# Patient Record
Sex: FEMALE | Race: WHITE | NOT HISPANIC OR LATINO | URBAN - METROPOLITAN AREA
[De-identification: names, ages, dates, MRNs, and addresses within clinical notes are randomized per-mention and may not be internally consistent; named-entity substitution may affect disease eponyms.]

---

## 2018-06-02 ENCOUNTER — EMERGENCY (EMERGENCY)
Facility: HOSPITAL | Age: 48
LOS: 0 days | Discharge: HOME | End: 2018-06-02
Attending: EMERGENCY MEDICINE | Admitting: EMERGENCY MEDICINE

## 2018-06-02 VITALS
RESPIRATION RATE: 18 BRPM | WEIGHT: 212.97 LBS | SYSTOLIC BLOOD PRESSURE: 138 MMHG | DIASTOLIC BLOOD PRESSURE: 86 MMHG | HEIGHT: 68 IN | OXYGEN SATURATION: 98 % | TEMPERATURE: 98 F | HEART RATE: 98 BPM

## 2018-06-02 DIAGNOSIS — F64.0 TRANSSEXUALISM: Chronic | ICD-10-CM

## 2018-06-02 DIAGNOSIS — M25.539 PAIN IN UNSPECIFIED WRIST: ICD-10-CM
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
## 2018-06-02 DIAGNOSIS — Z79.899 OTHER LONG TERM (CURRENT) DRUG THERAPY: ICD-10-CM

## 2018-06-02 DIAGNOSIS — S52.592A OTHER FRACTURES OF LOWER END OF LEFT RADIUS, INITIAL ENCOUNTER FOR CLOSED FRACTURE: ICD-10-CM
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
## 2018-06-02 RX ORDER — IBUPROFEN 200 MG
800 TABLET ORAL ONCE
Qty: 0 | Refills: 0 | Status: COMPLETED | OUTPATIENT
Start: 2018-06-02 | End: 2018-06-02

## 2018-06-02 RX ADMIN — Medication 800 MILLIGRAM(S): at 16:44

## 2018-06-02 NOTE — ED PROVIDER NOTE - OBJECTIVE STATEMENT
46 y/o female s/p fall PTA c/o left wrist pain and swelling. no other complaints. no head injury , neck or back pain. no tingling to digits

## 2018-06-02 NOTE — ED PROVIDER NOTE - ATTENDING CONTRIBUTION TO CARE
Pt is a 48yo female who comes in for L wrist pain after a fall onto outstretched arm while rollerskating at 4PM.  No numbness, no deformity.    Exam: tenderness to distal L radius, 2+ radial pulse, 5/5 hand , normal sensation in all fingers, no bony tenderness of hand or prox forearm/elbow/shoulder  Imp: fx  Plan: imaging, splint, ortho f/u

## 2018-06-02 NOTE — ED PROVIDER NOTE - PHYSICAL EXAMINATION
left wrist - +tenderness to distal radius and ulna with swelling, no step off / no snuffbox tenderness. good rom of digits/ no proximal tenderness/ good supination and pronation.

## 2018-06-02 NOTE — ED PROVIDER NOTE - NS ED ROS FT
Review of Systems    Constitutional: (-) fever or chills  respiratory: (-) cough (-) shortness of breath  Cardiovascular: (-) syncope, palpitations or chest pain  Integumentary: (-) rash or painful lymph nodes  Neurological: (-) altered mental status, headache or head injury

## 2019-09-24 ENCOUNTER — EMERGENCY (EMERGENCY)
Facility: HOSPITAL | Age: 49
LOS: 0 days | Discharge: HOME | End: 2019-09-24
Admitting: EMERGENCY MEDICINE
Payer: MEDICAID

## 2019-09-24 VITALS
OXYGEN SATURATION: 99 % | DIASTOLIC BLOOD PRESSURE: 99 MMHG | RESPIRATION RATE: 18 BRPM | WEIGHT: 214.07 LBS | HEART RATE: 99 BPM | SYSTOLIC BLOOD PRESSURE: 159 MMHG | HEIGHT: 69 IN | TEMPERATURE: 97 F

## 2019-09-24 DIAGNOSIS — S52.001A UNSPECIFIED FRACTURE OF UPPER END OF RIGHT ULNA, INITIAL ENCOUNTER FOR CLOSED FRACTURE: ICD-10-CM
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
## 2019-09-24 DIAGNOSIS — M25.572 PAIN IN LEFT ANKLE AND JOINTS OF LEFT FOOT: ICD-10-CM
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
## 2019-09-24 DIAGNOSIS — Z23 ENCOUNTER FOR IMMUNIZATION: ICD-10-CM

## 2019-09-24 DIAGNOSIS — F64.0 TRANSSEXUALISM: Chronic | ICD-10-CM
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
## 2019-09-24 PROCEDURE — 73610 X-RAY EXAM OF ANKLE: CPT | Mod: 26,LT

## 2019-09-24 PROCEDURE — 73080 X-RAY EXAM OF ELBOW: CPT | Mod: 26,RT

## 2019-09-24 PROCEDURE — 99284 EMERGENCY DEPT VISIT MOD MDM: CPT

## 2019-09-24 RX ORDER — TETANUS TOXOID, REDUCED DIPHTHERIA TOXOID AND ACELLULAR PERTUSSIS VACCINE, ADSORBED 5; 2.5; 8; 8; 2.5 [IU]/.5ML; [IU]/.5ML; UG/.5ML; UG/.5ML; UG/.5ML
0.5 SUSPENSION INTRAMUSCULAR ONCE
Refills: 0 | Status: COMPLETED | OUTPATIENT
Start: 2019-09-24 | End: 2019-09-24

## 2019-09-24 RX ADMIN — TETANUS TOXOID, REDUCED DIPHTHERIA TOXOID AND ACELLULAR PERTUSSIS VACCINE, ADSORBED 0.5 MILLILITER(S): 5; 2.5; 8; 8; 2.5 SUSPENSION INTRAMUSCULAR at 10:46

## 2019-09-24 NOTE — ED PROVIDER NOTE - CARE PROVIDER_API CALL
Jez Lay)  Orthopaedic Surgery  [address] [place] [address]  Phone: [phone]  Fax: [phone]  Follow Up Time: 1-3 Days

## 2019-09-24 NOTE — ED PROVIDER NOTE - OBJECTIVE STATEMENT
49 y.o female w/ no sig pmhx presents to the ED for evaluation of left ankle pain and right wrist pain x 2 days. Sustained inversion injury ankle yesterday with pain at lateral malleolus, sudden onset, mild severity, worse w/ ambulation, alleviated with rest, no radiation of pain. Also fell on R FOOSH and complaining of right elbow pain, mild severity, no radiation of pain, FROM without difficulty. Denies head injury, LOC, nausea, vomiting, abd pain, chest pain, back pain.  NO further complaints at this time.

## 2019-09-24 NOTE — ED PROVIDER NOTE - CLINICAL SUMMARY MEDICAL DECISION MAKING FREE TEXT BOX
no acute fx, sling right arm.  f/u with ortho.  I have discussed the discharge plan with the patient. The patient agrees with the plan, as discussed.  The patient understands Emergency Department diagnosis is a preliminary diagnosis often based on limited information and that the patient must adhere to the follow-up plan as discussed.  The patient understands that if the symptoms worsen or if prescribed medications do not have the desired/planned effect that the patient may return to the Emergency Department at any time for further evaluation and treatment.

## 2019-09-24 NOTE — ED PROVIDER NOTE - NS ED ROS FT
Varicose veins with pain to both legs    History of vein stripping by Dr Ileen Holstein approximately 2001  -Wearing 20-30 mmHg knee high compression stockings on a daily basis during waking hours can help manage your symptoms  -Other things we discussed that can help manage varicose vein pain:  Periodic elevation, regular physical activity, and weight loss  -Pay close attention to your symptoms over the next 90 days while you are trialing conservative therapy  -You will have a venous reflux assessment in 90 days and followup with a physician afterward for further recommendations Constitutional: See HPI.  Eyes: No visual changes, eye pain or discharge. No Photophobia  ENMT: No hearing changes, pain, discharge or infections. No neck pain or stiffness. No limited ROM  Cardiac: No SOB or edema. No chest pain with exertion.  Respiratory: No cough or respiratory distress. No hemoptysis. No history of asthma or RAD.  GI: No nausea, vomiting, diarrhea or abdominal pain.  : No dysuria, frequency or burning. No Discharge  MS: + L ankle pain, + R wrist pain. No myalgia,  back pain.  Neuro: No headache or weakness. No LOC.  Skin: No skin rash.  Except as documented in the HPI, all other systems are negative.

## 2019-09-24 NOTE — ED PROVIDER NOTE - PHYSICAL EXAMINATION
CONST: Well appearing in NAD  HEAD: NC/AT  EYES: Sclera and conjunctiva clear.  NECK: No midline C/T/L tenderness   CARD: Normal S1 S2; Normal rate and rhythm  RESP: Equal BS B/L, No wheezes, rhonchi or rales. No distress  GI: Soft, non-tender, non-distended, no CVA tenderness   MS: + TTP left lateral malleolus, no TTP of hip or knee, + TTP right medial elbow, FROM without difficulty, no ecchymosis or swelling, Normal ROM in all extremities. No edema of lower extremities, no calf pain, radial/pedal pulses 2+ bilaterally  SKIN: Warm, dry, no acute rashes. Good turgor  NEURO: A&Ox3, No focal deficits. Strength 5/5 with no sensory deficits. Steady gait

## 2019-09-24 NOTE — ED PROVIDER NOTE - NSFOLLOWUPINSTRUCTIONS_ED_ALL_ED_FT
Fracture    A fracture is a break in one of your bones. This can occur from a variety of injuries, especially traumatic ones. Symptoms include pain, bruising, or swelling. Do not use the injured limb. If a fracture is in one of the bones below your waist, do not put weight on that limb unless instructed to do so by your healthcare provider. Crutches or a cane may have been provided. A splint or cast may have been applied by your health care provider. Make sure to keep it dry and follow up with an orthopedist as instructed.    SEEK IMMEDIATE MEDICAL CARE IF YOU HAVE ANY OF THE FOLLOWING SYMPTOMS: numbness, tingling, increasing pain, or weakness in any part of the injured limb.    Follow up with your primary medical doctor in 1-2 days

## 2019-09-24 NOTE — ED ADULT TRIAGE NOTE - CHIEF COMPLAINT QUOTE
"I was taking a walk yesterday outside and missed a step and twisted my left ankle and my right arm hurts"

## 2019-09-24 NOTE — ED PROVIDER NOTE - PATIENT PORTAL LINK FT
You can access the FollowMyHealth Patient Portal offered by [facility] by registering at the following website: [email or web address]. By joining [employer] FollowMyHealth portal, you will also be able to view your health information using other applications (apps) compatible with our system.

## 2022-09-15 ENCOUNTER — APPOINTMENT (OUTPATIENT)
Dept: ORTHOPEDIC SURGERY | Facility: CLINIC | Age: 52
End: 2022-09-15

## 2022-09-15 VITALS — WEIGHT: 225 LBS | HEIGHT: 69 IN | BODY MASS INDEX: 33.33 KG/M2

## 2022-09-15 DIAGNOSIS — S49.92XA UNSPECIFIED INJURY OF LEFT SHOULDER AND UPPER ARM, INITIAL ENCOUNTER: ICD-10-CM

## 2022-09-15 DIAGNOSIS — Z78.9 OTHER SPECIFIED HEALTH STATUS: ICD-10-CM

## 2022-09-15 PROBLEM — Z00.00 ENCOUNTER FOR PREVENTIVE HEALTH EXAMINATION: Status: ACTIVE | Noted: 2022-09-15

## 2022-09-15 PROCEDURE — 99203 OFFICE O/P NEW LOW 30 MIN: CPT

## 2022-09-15 PROCEDURE — 99072 ADDL SUPL MATRL&STAF TM PHE: CPT

## 2022-09-15 PROCEDURE — 73080 X-RAY EXAM OF ELBOW: CPT | Mod: LT

## 2022-09-15 PROCEDURE — 73030 X-RAY EXAM OF SHOULDER: CPT | Mod: LT

## 2022-09-15 NOTE — HISTORY OF PRESENT ILLNESS
[de-identified] : Patient is a 52-year-old female here for left shoulder/ elbow pain.  Patient states on 08/20/2022 she tripped on the sidewalk outside in fell on her left shoulder and elbow.  Patient states she was immediate pain but iced and he the area.  Patient states over time and the pain was improving.  Patient states that she is still having on and off pain went to grab onto a bus handle a few days ago and felt a worsening pain in the elbow/shoulder area.  Patient denies numbness and tingling.

## 2022-09-15 NOTE — PHYSICAL EXAM
[Left] : left elbow [FreeTextEntry8] :  mild tenderness to anterior glenohumeral joint [] : no pain with forced wrist flexion [de-identified] :  no pain to palpation throughout elbow [FreeTextEntry9] :  full range of motion with mild discomfort to  elbow [de-identified] :  good strength throughout

## 2022-09-15 NOTE — DISCUSSION/SUMMARY
[de-identified] :  discussed x-rays in detail patient.   patient has a strain/ contusion of shoulder elbow.  Discussed treatment options including rest, anti-inflammatories, range-of-motion exercise, physical therapy, ice /heat.  Physical therapy script given.  Follow-up in 6-8 weeks for re-evaluation sports department and Hand Department.  Call if symptoms worsen or change.  Patient understands agrees with plan.  discussed with patient if her symptoms 100% improved she can cancel her appointment.  Seen under supervision of Dr. Ibrahim

## 2022-09-15 NOTE — DATA REVIEWED
[Shoulder] : shoulder [Left] : left [Elbow] : elbow [FreeTextEntry1] : X-ray: no acute fractures, subluxations, dislocations.  Mild osteoarthritis, AC joint arthrosis [FreeTextEntry2] :  x-ray: no acute fractures, subluxations, dislocations

## 2022-10-27 ENCOUNTER — APPOINTMENT (OUTPATIENT)
Dept: ORTHOPEDIC SURGERY | Facility: CLINIC | Age: 52
End: 2022-10-27

## 2022-10-27 DIAGNOSIS — S49.92XD UNSPECIFIED INJURY OF LEFT SHOULDER AND UPPER ARM, SUBSEQUENT ENCOUNTER: ICD-10-CM

## 2022-10-27 PROCEDURE — 99213 OFFICE O/P EST LOW 20 MIN: CPT

## 2022-10-27 PROCEDURE — 99072 ADDL SUPL MATRL&STAF TM PHE: CPT

## 2022-10-27 NOTE — HISTORY OF PRESENT ILLNESS
[de-identified] :  Patient is a 52-year-old female who reports to the office for subsequent re-evaluation of her left shoulder pain.  She has not had a chance to do formal physical therapy.  She states that her shoulder pain has improved significantly since her initial visit.  Denies any pain with range of motion.  Denies any numbness or tingling.

## 2022-10-27 NOTE — DISCUSSION/SUMMARY
[de-identified] :   Patient's pain and range of motion have improved significantly since her initial visit.  She will take OTC Tylenol or Motrin as needed for pain.\par \par The patient was advised to rest/ice the area and can alternate with warm compresses as needed.  The shoulder conditioning program from the [employer] was printed and given to the patient so she may try that at home.  \par \par Patient will follow-up on as-needed basis.  All of the patient's questions/concerns were answered in detail.  \par \par The patient was seen under the supervision of Dr. Paula.\par \par

## 2022-11-02 ENCOUNTER — APPOINTMENT (OUTPATIENT)
Dept: ORTHOPEDIC SURGERY | Facility: CLINIC | Age: 52
End: 2022-11-02

## 2022-11-02 VITALS — WEIGHT: 225 LBS | BODY MASS INDEX: 33.33 KG/M2 | HEIGHT: 69 IN

## 2022-11-02 DIAGNOSIS — S59.902A UNSPECIFIED INJURY OF LEFT ELBOW, INITIAL ENCOUNTER: ICD-10-CM

## 2022-11-02 PROCEDURE — 99213 OFFICE O/P EST LOW 20 MIN: CPT

## 2022-11-02 PROCEDURE — 99072 ADDL SUPL MATRL&STAF TM PHE: CPT

## 2022-11-02 NOTE — DISCUSSION/SUMMARY
[de-identified] :   She is about 6 weeks status post her injury.\par She has full range of motion and strength to her elbow.\par She may resume normal activities as tolerated.\par She will follow up as needed.\par All questions were answered today.

## 2022-11-02 NOTE — HISTORY OF PRESENT ILLNESS
[de-identified] : Patient is a 52-year-old female here for repeat evaluation of her left elbow.  He is about 6 weeks status post her injury.  She is feeling much better.

## 2022-11-02 NOTE — PHYSICAL EXAM
[de-identified] :   Physical exam of her left elbow:  Negative swelling or ecchymosis.  Nontender over the lateral or medial epicondyle.  Negative olecranon tenderness.  Full range of motion of the elbow.  Strength is 5/5.  Sensory and motor are intact.